# Patient Record
(demographics unavailable — no encounter records)

---

## 2025-01-08 NOTE — HISTORY OF PRESENT ILLNESS
[TextBox_4] : 64 yo M with PMH asthma (on Nucala) and SINTIA (not on therapy) who presents for follow up.   He has been receiving Nucala injections in the office every 4 weeks. Last Nucala injection 12/5/24.  He reports fatigue. No wheezing but has some left chest tightness on occasion. No cough but feels he has oral secretions. Mild nasal congestion.  He ran out of flonase.   He is using symbicort once every 2 days. Not using albuterol prn.   No fevers or chills.

## 2025-01-08 NOTE — REVIEW OF SYSTEMS
[Fever] : no fever [Chills] : no chills [Sinus Problems] : no sinus problems [Cough] : no cough [Wheezing] : no wheezing [GERD] : no gerd

## 2025-01-08 NOTE — PHYSICAL EXAM
[No Acute Distress] : no acute distress [Normal Appearance] : normal appearance [Normal Rate/Rhythm] : normal rate/rhythm [Normal S1, S2] : normal s1, s2 [No Resp Distress] : no resp distress [Clear to Auscultation Bilaterally] : clear to auscultation bilaterally [No Clubbing] : no clubbing [No Edema] : no edema [No Focal Deficits] : no focal deficits [Oriented x3] : oriented x3 [Normal Affect] : normal affect

## 2025-01-08 NOTE — PROCEDURE
[FreeTextEntry1] : Nucala 100 mg reconstituted solution administered SQ to RU arm by DELFINO  Lot#[id number] Exp: 12/2027 NDC # 9255-6598-45  Pt tolerated injection well. Epi on hand. RTC in 4 weeks for next inj

## 2025-01-08 NOTE — ASSESSMENT
[FreeTextEntry1] : 62 yo M with PMH asthma (on Nucala) who presents for follow up.  # asthma - continue Nucala q4 weeks (injection to be given in office today) - continue symbicort - refilled today - continue azelastine and flonase nasal sprays - f/u with Dr. Boothe  # SINTIA Prior sleep study with moderate SINTIA but not currently on treatment - repeat in lab PSG for reevaluation - weight loss, exercise  Will go to pharmacy for RSV vaccine and flu vaccine (if has not received with PCP)

## 2025-06-11 NOTE — ASSESSMENT
[FreeTextEntry1] : Patient will continue on Nucala.  He is getting his injection today Continue with the Singulair and Symbicort.  As needed albuterol. Repeat pulmonary function test and exhaled nitric oxide testing CBC with differential today Last CAT scan was in 2020, at that time shows nonspecific inflammatory groundglass. This symptom of not being able to fully expand and feeling chest tightness on the left side is chronic, but reasonable at this point to repeat a CT. He had an polysomnography in 2021 which showed moderate obstructive sleep apnea, still trying to get him to follow-up on that.  Split study ordered, he will call to schedule.  He is also on Mounjaro now and has lost some weight so may be improved  I gave patient a copy of my note from 2016 documenting asthma, from the first time we met.  He is trying to gather records so that he can enrolled in the [facility] program.  Follow-up timing based on above results

## 2025-06-11 NOTE — PHYSICAL EXAM
[No Acute Distress] : no acute distress [Normal Oropharynx] : normal oropharynx [Normal Appearance] : normal appearance [No Neck Mass] : no neck mass [Normal Rate/Rhythm] : normal rate/rhythm [Normal S1, S2] : normal s1, s2 [No Murmurs] : no murmurs [No Resp Distress] : no resp distress [Clear to Auscultation Bilaterally] : clear to auscultation bilaterally [No Abnormalities] : no abnormalities [Benign] : benign [Normal Gait] : normal gait [No Clubbing] : no clubbing [No Cyanosis] : no cyanosis [No Edema] : no edema [FROM] : FROM [Normal Color/ Pigmentation] : normal color/ pigmentation [No Focal Deficits] : no focal deficits [Oriented x3] : oriented x3 [Normal Affect] : normal affect No/HCP provided and explained

## 2025-06-11 NOTE — HISTORY OF PRESENT ILLNESS
[Never] : never [TextBox_4] : Patient is here for follow-up visit, severe persistent allergic asthma, history of chronic sinus disease.  Obstructive sleep apnea currently not treated.  Diabetes and obesity.    Patient is now maintained on Nucala overall has been doing well.  He has not had recent exacerbations. He is still using his Symbicort and Singulair. He feels that this month he has some increased chest tightness on the left side, but no wheezing.  This has been a frequent longstanding complaint with negative workups in the past. He is using nasal Astelin/fluticasone for his chronic sinus disease which is doing much better since surgery. He started on Mounjaro about 3 months ago and estimated he has lost about 20 pounds. He has not yet scheduled a follow-up in-lab sleep study/CPAP titration

## 2025-06-24 NOTE — ASSESSMENT
[FreeTextEntry1] : CT chest negative except mild b/l mosaicism, PFT and FENO today -- FENO is low, PFT shows no obstruction or BD response Asthma is well controlled with nucala There is some restriction with preserved dlco, BMI 30 with central obesity - weight loss recommended and f/u with PCP

## 2025-06-24 NOTE — HISTORY OF PRESENT ILLNESS
[TextBox_4] : Follow up visit, eos asthma, on Nucala, symbicort Still feels the same "pain in left lung," and that cant expand it. chronic  He had the Chest CT, which showed b/l mild mosaicism c/w his known airways disease, otherwise negative